# Patient Record
Sex: FEMALE | Race: BLACK OR AFRICAN AMERICAN | NOT HISPANIC OR LATINO | URBAN - METROPOLITAN AREA
[De-identification: names, ages, dates, MRNs, and addresses within clinical notes are randomized per-mention and may not be internally consistent; named-entity substitution may affect disease eponyms.]

---

## 2019-08-07 ENCOUNTER — EMERGENCY (EMERGENCY)
Facility: HOSPITAL | Age: 54
LOS: 1 days | Discharge: HOME | End: 2019-08-07
Admitting: EMERGENCY MEDICINE
Payer: SELF-PAY

## 2019-08-07 VITALS
SYSTOLIC BLOOD PRESSURE: 177 MMHG | HEART RATE: 75 BPM | WEIGHT: 130.07 LBS | DIASTOLIC BLOOD PRESSURE: 91 MMHG | OXYGEN SATURATION: 97 % | RESPIRATION RATE: 18 BRPM | TEMPERATURE: 98 F | HEIGHT: 66 IN

## 2019-08-07 PROCEDURE — 99283 EMERGENCY DEPT VISIT LOW MDM: CPT

## 2019-08-07 RX ORDER — ERYTHROMYCIN BASE 5 MG/GRAM
1 OINTMENT (GRAM) OPHTHALMIC (EYE)
Qty: 1 | Refills: 0
Start: 2019-08-07 | End: 2019-08-13

## 2019-08-07 NOTE — ED PROVIDER NOTE - NSFOLLOWUPINSTRUCTIONS_ED_ALL_ED_FT
I have discussed the discharge plan with the patient. The patient agrees with the plan, as discussed.  The patient understands Emergency Department diagnosis is a preliminary diagnosis often based on limited information and that the patient must adhere to the follow-up plan as discussed.  The patient understands that if the symptoms worsen or if prescribed medications do not have the desired/planned effect that the patient may return to the Emergency Department at any time for further evaluation and treatment.          STYE - AfterCare(R) Instructions(ER/ED)     Stye    WHAT YOU NEED TO KNOW:    A stye is a lump on the edge or inside of your eyelid caused by an infection. A stye can form on your upper or lower eyelid. It usually goes away in 2 to 4 days.    DISCHARGE INSTRUCTIONS:    Medicines:     Antibiotic medicine: This is given as an ointment to put into your eye. It is used to fight an infection caused by bacteria. Use as directed.       Take your medicine as directed. Contact your healthcare provider if you think your medicine is not helping or if you have side effects. Tell him of her if you are allergic to any medicine. Keep a list of the medicines, vitamins, and herbs you take. Include the amounts, and when and why you take them. Bring the list or the pill bottles to follow-up visits. Carry your medicine list with you in case of an emergency.    Follow up with your healthcare provider as directed: Write down your questions so you remember to ask them during your visits.     Self-care:     Use warm compresses: This will help decrease swelling and pain. Wet a clean washcloth with warm water and place it on your eye for 10 to 15 minutes, 3 to 4 times each day or as directed.      Keep your hands away from your eye: This helps to prevent the spread of the infection to other parts of the eye. Wash your hands often with soap and dry with a clean towel. Do not squeeze the stye.       Do not use eye makeup: Do not wear eye makeup while you have a stye. Eye makeup may carry bacteria and cause another stye. Throw away eye makeup and brushes used to apply the makeup. Use new eye makeup after the stye has gone away. Do not share eye makeup with others.      Prevent another stye: Wash your face and clean your eyelashes every day. Remove eye makeup with makeup remover. This helps to completely remove eye makeup without heavy rubbing.     Contact your healthcare provider if:     You have redness and discharge around your eye, and your eye pain is getting worse.      Your vision changes.      The stye has not gone away within 7 days.       The stye comes back within a short period of time after treatment.      You have questions or concerns about your condition or care.

## 2019-08-07 NOTE — ED PROVIDER NOTE - NSFOLLOWUPCLINICS_GEN_ALL_ED_FT
[facility]  Ophthalmology  [address] [place] [address]  Phone: [phone]  Fax:   Follow Up Time:

## 2019-08-07 NOTE — ED PROVIDER NOTE - EYES, MLM
Clear bilaterally, pupils equal, round and reactive to light. Raised pustules on bilateral eyes, mildly erythematous.

## 2019-08-07 NOTE — ED PROVIDER NOTE - OBJECTIVE STATEMENT
53 y/o F with no PMHx presents to ED with raised red bumps on both eyes, larger on the R x 1 month. States that she originally got the bump on her R eye on July 2nd and used an icepack to treat it, no improvement. August 1st, she woke up with her eye swollen and closed up, and went to her PCP who stated she had conjunctivitis and prescribed her antibiotic drops. Today, conjunctivitis resolved but bumps persist even after treatment. States they are slightly painful and itchy, but do not cause any vision changes.

## 2019-08-07 NOTE — ED ADULT NURSE NOTE - NSIMPLEMENTINTERV_GEN_ALL_ED
Implemented All Universal Safety Interventions:  [place] to call system. Call bell, personal items and telephone within reach. Instruct patient to call for assistance. Room bathroom lighting operational. Non-slip footwear when patient is off stretcher. Physically safe environment: no spills, clutter or unnecessary equipment. Stretcher in lowest position, wheels locked, appropriate side rails in place.

## 2019-08-07 NOTE — ED PROVIDER NOTE - CLINICAL SUMMARY MEDICAL DECISION MAKING FREE TEXT BOX
55 y/o F with no PMHx presents to ED with styes on bilateral eyes x 1 month. Patient states that she had conjunctivitis a week ago as diagnosed by her PCP which is now resolved, but styes persist. Plan to prescribe antibiotic ointment. Patient advised to apply hot compress to eye. 55 y/o F with no PMHx presents to ED with styes on bilateral eyes. Symptoms started on right eye first about 1 month ago, than on the left noted too.   Patient states that she also had conjunctivitis a week ago , which is resolved now, but styes persist. Plan : warm compresses, abx ointment, d/c for opthalmology f/u.

## 2019-08-07 NOTE — ED ADULT NURSE NOTE - CHPI ED NUR SYMPTOMS NEG
no discharge/no photophobia/no purulent drainage/no double vision/no foreign body/no itching/no blurred vision/no pain

## 2019-08-07 NOTE — ED ADULT TRIAGE NOTE - ARRIVAL INFO ADDITIONAL COMMENTS
c.o painful styes to b/l upper eyelids with yellowish discharge for one month. denies any visual changes, fever/chills

## 2019-08-12 DIAGNOSIS — H00.016 HORDEOLUM EXTERNUM LEFT EYE, UNSPECIFIED EYELID: ICD-10-CM

## 2019-08-12 DIAGNOSIS — H00.013 HORDEOLUM EXTERNUM RIGHT EYE, UNSPECIFIED EYELID: ICD-10-CM

## 2019-08-12 DIAGNOSIS — H57.13 OCULAR PAIN, BILATERAL: ICD-10-CM
